# Patient Record
Sex: FEMALE | Race: WHITE | NOT HISPANIC OR LATINO | URBAN - METROPOLITAN AREA
[De-identification: names, ages, dates, MRNs, and addresses within clinical notes are randomized per-mention and may not be internally consistent; named-entity substitution may affect disease eponyms.]

---

## 2017-01-05 ENCOUNTER — OUTPATIENT (OUTPATIENT)
Dept: OUTPATIENT SERVICES | Facility: HOSPITAL | Age: 47
LOS: 1 days | End: 2017-01-05
Payer: COMMERCIAL

## 2017-01-05 VITALS
HEIGHT: 65 IN | WEIGHT: 166.89 LBS | HEART RATE: 66 BPM | RESPIRATION RATE: 16 BRPM | SYSTOLIC BLOOD PRESSURE: 143 MMHG | DIASTOLIC BLOOD PRESSURE: 71 MMHG | TEMPERATURE: 98 F

## 2017-01-05 DIAGNOSIS — Z98.1 ARTHRODESIS STATUS: Chronic | ICD-10-CM

## 2017-01-05 DIAGNOSIS — Z01.818 ENCOUNTER FOR OTHER PREPROCEDURAL EXAMINATION: ICD-10-CM

## 2017-01-05 DIAGNOSIS — Z98.890 OTHER SPECIFIED POSTPROCEDURAL STATES: Chronic | ICD-10-CM

## 2017-01-05 DIAGNOSIS — D21.5 BENIGN NEOPLASM OF CONNECTIVE AND OTHER SOFT TISSUE OF PELVIS: ICD-10-CM

## 2017-01-05 DIAGNOSIS — Z98.89 OTHER SPECIFIED POSTPROCEDURAL STATES: Chronic | ICD-10-CM

## 2017-01-05 DIAGNOSIS — M43.22 FUSION OF SPINE, CERVICAL REGION: Chronic | ICD-10-CM

## 2017-01-05 DIAGNOSIS — D25.9 LEIOMYOMA OF UTERUS, UNSPECIFIED: ICD-10-CM

## 2017-01-05 LAB
HCG SERPL-ACNC: <1 MIU/ML — SIGNIFICANT CHANGE UP
HCT VFR BLD CALC: 43 % — SIGNIFICANT CHANGE UP (ref 34.5–45)
HGB BLD-MCNC: 13.9 G/DL — SIGNIFICANT CHANGE UP (ref 11.5–15.5)
MCHC RBC-ENTMCNC: 29.9 PG — SIGNIFICANT CHANGE UP (ref 27–34)
MCHC RBC-ENTMCNC: 32.2 GM/DL — SIGNIFICANT CHANGE UP (ref 32–36)
MCV RBC AUTO: 92.9 FL — SIGNIFICANT CHANGE UP (ref 80–100)
PLATELET # BLD AUTO: 228 K/UL — SIGNIFICANT CHANGE UP (ref 150–400)
RBC # BLD: 4.63 M/UL — SIGNIFICANT CHANGE UP (ref 3.8–5.2)
RBC # FLD: 12.6 % — SIGNIFICANT CHANGE UP (ref 10.3–14.5)
WBC # BLD: 7.4 K/UL — SIGNIFICANT CHANGE UP (ref 3.8–10.5)
WBC # FLD AUTO: 7.4 K/UL — SIGNIFICANT CHANGE UP (ref 3.8–10.5)

## 2017-01-05 PROCEDURE — 86850 RBC ANTIBODY SCREEN: CPT

## 2017-01-05 PROCEDURE — 86900 BLOOD TYPING SEROLOGIC ABO: CPT

## 2017-01-05 PROCEDURE — 86901 BLOOD TYPING SEROLOGIC RH(D): CPT

## 2017-01-05 PROCEDURE — 85027 COMPLETE CBC AUTOMATED: CPT

## 2017-01-05 PROCEDURE — G0463: CPT

## 2017-01-05 PROCEDURE — 84702 CHORIONIC GONADOTROPIN TEST: CPT

## 2017-01-05 NOTE — H&P PST ADULT - FAMILY HISTORY
Father  Still living? Yes, Estimated age: 71-80  Family history of renal cancer, Age at diagnosis: Age Unknown  Family history of prostate cancer in father, Age at diagnosis: Age Unknown     Mother  Still living? No  Family history of myocardial infarction, Age at diagnosis: Age Unknown  Family history of cerebrovascular accident (CVA), Age at diagnosis: Age Unknown  Family history of COPD (chronic obstructive pulmonary disease), Age at diagnosis: Age Unknown

## 2017-01-05 NOTE — ASU PATIENT PROFILE, ADULT - PSH
Cervical vertebral fusion  April 2016  H/O hand surgery  Left thumb 2015  S/P lumbar fusion  August 2016  S/P tubal ligation  2006  Status post labral repair of shoulder  Left, 2012

## 2017-01-05 NOTE — H&P PST ADULT - NSANTHOSAYNRD_GEN_A_CORE
No. ANGIE screening performed.  STOP BANG Legend: 0-2 = LOW Risk; 3-4 = INTERMEDIATE Risk; 5-8 = HIGH Risk

## 2017-01-05 NOTE — H&P PST ADULT - PROBLEM SELECTOR PLAN 2
labs- CBC, HCG and T&S  No MC needed  Pre op and Hibiclens instructions reviewed and given. Can continue present pain management Oxycodone as needed am of surgery.
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
## 2017-01-05 NOTE — H&P PST ADULT - ASSESSMENT
47 yo female with a uterine fibroid scheduled for a laparoscopic myomectomy on 1/13/17 with Dr. Lobato

## 2017-01-05 NOTE — H&P PST ADULT - PSH
H/O hand surgery  Left thumb 2015  S/P tubal ligation  2006  Status post labral repair of shoulder  Left, 2012 Cervical vertebral fusion  April 2016  H/O hand surgery  Left thumb 2015  S/P lumbar fusion  August 2016  S/P tubal ligation  2006  Status post labral repair of shoulder  Left, 2012
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
## 2017-01-05 NOTE — H&P PST ADULT - HISTORY OF PRESENT ILLNESS
47 yo female presents to PST with a uterine fibroid scheduled for a  laparoscopic myomectomy on 17 with Dr. Lobato.  LMP 2016. H/O TOP. Ultrasound shows a pedunculated myoma 6 cm, 8 week sized uterus. C/O pelvic pain. H/O abnormal pap. (+) HPV. H/O chronic low back pain 47 yo female presents to PST with a uterine fibroid scheduled for a laparoscopic myomectomy on 17 with Dr. Lobato.  LMP 2016. H/O TOP. Ultrasound shows a pedunculated myoma 6 cm, 8 week sized uterus. C/O right pelvic pain. H/O abnormal pap. (+) HPV. H/O chronic back pain s/p cervical and lumbar  fusion in . Followed by pain management.

## 2017-01-12 RX ORDER — SODIUM CHLORIDE 9 MG/ML
1000 INJECTION, SOLUTION INTRAVENOUS
Qty: 0 | Refills: 0 | Status: DISCONTINUED | OUTPATIENT
Start: 2017-01-13 | End: 2017-01-13

## 2017-01-13 ENCOUNTER — OUTPATIENT (OUTPATIENT)
Dept: OUTPATIENT SERVICES | Facility: HOSPITAL | Age: 47
LOS: 1 days | Discharge: HOME | End: 2017-01-13
Payer: COMMERCIAL

## 2017-01-13 VITALS
HEART RATE: 74 BPM | DIASTOLIC BLOOD PRESSURE: 64 MMHG | SYSTOLIC BLOOD PRESSURE: 118 MMHG | OXYGEN SATURATION: 98 % | RESPIRATION RATE: 16 BRPM

## 2017-01-13 VITALS
TEMPERATURE: 98 F | OXYGEN SATURATION: 98 % | DIASTOLIC BLOOD PRESSURE: 76 MMHG | HEART RATE: 79 BPM | WEIGHT: 166.89 LBS | SYSTOLIC BLOOD PRESSURE: 127 MMHG | RESPIRATION RATE: 16 BRPM | HEIGHT: 65 IN

## 2017-01-13 DIAGNOSIS — M43.22 FUSION OF SPINE, CERVICAL REGION: Chronic | ICD-10-CM

## 2017-01-13 DIAGNOSIS — D21.5 BENIGN NEOPLASM OF CONNECTIVE AND OTHER SOFT TISSUE OF PELVIS: ICD-10-CM

## 2017-01-13 DIAGNOSIS — Z98.890 OTHER SPECIFIED POSTPROCEDURAL STATES: Chronic | ICD-10-CM

## 2017-01-13 DIAGNOSIS — Z98.1 ARTHRODESIS STATUS: Chronic | ICD-10-CM

## 2017-01-13 DIAGNOSIS — Z98.89 OTHER SPECIFIED POSTPROCEDURAL STATES: Chronic | ICD-10-CM

## 2017-01-13 LAB — HCG UR QL: NEGATIVE — SIGNIFICANT CHANGE UP

## 2017-01-13 PROCEDURE — C1889: CPT

## 2017-01-13 PROCEDURE — 58545 LAPAROSCOPIC MYOMECTOMY: CPT

## 2017-01-13 PROCEDURE — 88305 TISSUE EXAM BY PATHOLOGIST: CPT | Mod: 26

## 2017-01-13 PROCEDURE — 81025 URINE PREGNANCY TEST: CPT

## 2017-01-13 PROCEDURE — 88305 TISSUE EXAM BY PATHOLOGIST: CPT

## 2017-01-13 RX ORDER — CEFAZOLIN SODIUM 1 G
2000 VIAL (EA) INJECTION ONCE
Qty: 0 | Refills: 0 | Status: COMPLETED | OUTPATIENT
Start: 2017-01-13 | End: 2017-01-13

## 2017-01-13 RX ORDER — FENTANYL CITRATE 50 UG/ML
50 INJECTION INTRAVENOUS
Qty: 0 | Refills: 0 | Status: DISCONTINUED | OUTPATIENT
Start: 2017-01-13 | End: 2017-01-13

## 2017-01-13 RX ADMIN — SODIUM CHLORIDE 75 MILLILITER(S): 9 INJECTION, SOLUTION INTRAVENOUS at 08:46

## 2017-01-13 RX ADMIN — FENTANYL CITRATE 50 MICROGRAM(S): 50 INJECTION INTRAVENOUS at 11:13

## 2017-01-13 RX ADMIN — SODIUM CHLORIDE 100 MILLILITER(S): 9 INJECTION, SOLUTION INTRAVENOUS at 11:12

## 2017-01-13 RX ADMIN — FENTANYL CITRATE 50 MICROGRAM(S): 50 INJECTION INTRAVENOUS at 11:23

## 2017-01-13 RX ADMIN — FENTANYL CITRATE 50 MICROGRAM(S): 50 INJECTION INTRAVENOUS at 11:24

## 2017-01-13 NOTE — ASU DISCHARGE PLAN (ADULT/PEDIATRIC). - ACTIVITY LEVEL
no heavy lifting/no tampons/no exercise/no douching/nothing per vagina/no sports/gym/no tub baths/no intercourse

## 2017-01-13 NOTE — ASU DISCHARGE PLAN (ADULT/PEDIATRIC). - INSTRUCTIONS
****Call the office with any problems including but not limited to heavy vaginal bleeding, fevers, severe abdominal pain, inability to eat/drink/urinate  **** Nothing in the vagina x2 weeks-( No sex, tampons, douching )  *****You may shower as usual but no hot tubs, bath tubs, swimming pools x2 weeks.

## 2017-01-13 NOTE — BRIEF OPERATIVE NOTE - OPERATION/FINDINGS
6 cm right retroperitoneal broad ligament myoma. Right retroperitoneum opened and ureter dissected from pelvic brim to deep pelvis. Myoma excised without difficulty, arising from 3 cm pedicle in right cervix/lower uterine segment.

## 2017-01-13 NOTE — ASU DISCHARGE PLAN (ADULT/PEDIATRIC). - NOTIFY
Fever greater than 101/Bleeding that does not stop/Pain not relieved by Medications/Persistent Nausea and Vomiting/Excessive Diarrhea Excessive Diarrhea/Swelling that continues/Fever greater than 101/Bleeding that does not stop/Pain not relieved by Medications/Persistent Nausea and Vomiting/GYN Fever>100.4/Unable to Urinate

## 2017-01-13 NOTE — ASU DISCHARGE PLAN (ADULT/PEDIATRIC). - MEDICATION SUMMARY - MEDICATIONS TO TAKE
I will START or STAY ON the medications listed below when I get home from the hospital:    Fioricet oral capsule  -- 1 tab(s) by mouth prn, As Needed  -- Indication: For home med    Motrin 600 mg oral tablet  -- 1 tab(s) by mouth every 8 hours x 3 days round the clock  -- Indication: For pain med    oxyCODONE 15 mg oral tablet  -- 1 tab(s) by mouth every 6 hours  -- Indication: For pain med    gabapentin 600 mg oral tablet  -- 1 tab(s) by mouth 3 times a day  -- Indication: For home med    gabapentin 300 mg oral tablet  --  by mouth 3 times a day  -- Indication: For home med    Benadryl Children's Allergy Fastmelt 12.5 mg oral tablet, disintegrating  -- 2 cap(s) by mouth prn, As Needed  -- Indication: For home med    SEROquel 100 mg oral tablet  -- 1 tab(s) by mouth once a day (at bedtime)  -- Indication: For home med    Soma 350 mg oral tablet  -- 1 tab(s) by mouth 3 times a day  -- Indication: For home med    gentamicin 0.3% ophthalmic ointment  -- 0.5 inch(es) to each affected eye 3 times a day  -- Indication: For home med    multivitamin  --   once a day  -- Indication: For home med

## 2017-01-17 LAB — SURGICAL PATHOLOGY FINAL REPORT - CH: SIGNIFICANT CHANGE UP

## 2017-01-18 DIAGNOSIS — Z98.1 ARTHRODESIS STATUS: ICD-10-CM

## 2017-01-18 DIAGNOSIS — D25.9 LEIOMYOMA OF UTERUS, UNSPECIFIED: ICD-10-CM

## 2017-01-18 DIAGNOSIS — Z79.899 OTHER LONG TERM (CURRENT) DRUG THERAPY: ICD-10-CM

## 2017-01-18 DIAGNOSIS — M79.7 FIBROMYALGIA: ICD-10-CM

## 2017-01-18 DIAGNOSIS — Z79.891 LONG TERM (CURRENT) USE OF OPIATE ANALGESIC: ICD-10-CM

## 2017-03-27 NOTE — ASU PREOP CHECKLIST - TYPE OF SOLUTION
"Chief Complaint   Patient presents with     Physical       Initial /82 (BP Location: Right arm, Patient Position: Chair, Cuff Size: Adult Large)  Pulse 82  Temp 98.1  F (36.7  C) (Oral)  Ht 5' 7\" (1.702 m)  Wt 185 lb 8 oz (84.1 kg)  SpO2 95%  BMI 29.05 kg/m2 Estimated body mass index is 29.05 kg/(m^2) as calculated from the following:    Height as of this encounter: 5' 7\" (1.702 m).    Weight as of this encounter: 185 lb 8 oz (84.1 kg).  Medication Reconciliation: complete Riana Daily CMA      " lr

## 2017-04-26 PROBLEM — M54.9 DORSALGIA, UNSPECIFIED: Chronic | Status: ACTIVE | Noted: 2017-01-05

## 2017-04-26 PROBLEM — D25.9 LEIOMYOMA OF UTERUS, UNSPECIFIED: Chronic | Status: ACTIVE | Noted: 2017-01-05

## 2017-05-08 ENCOUNTER — APPOINTMENT (OUTPATIENT)
Dept: OBGYN | Facility: CLINIC | Age: 47
End: 2017-05-08

## 2017-05-24 ENCOUNTER — APPOINTMENT (OUTPATIENT)
Dept: OBGYN | Facility: CLINIC | Age: 47
End: 2017-05-24

## 2017-05-24 ENCOUNTER — LABORATORY RESULT (OUTPATIENT)
Age: 47
End: 2017-05-24

## 2017-05-24 VITALS
DIASTOLIC BLOOD PRESSURE: 72 MMHG | WEIGHT: 170 LBS | BODY MASS INDEX: 28.32 KG/M2 | HEIGHT: 65 IN | SYSTOLIC BLOOD PRESSURE: 120 MMHG

## 2017-05-24 DIAGNOSIS — Z98.890 OTHER SPECIFIED POSTPROCEDURAL STATES: ICD-10-CM

## 2017-05-24 DIAGNOSIS — Z80.3 FAMILY HISTORY OF MALIGNANT NEOPLASM OF BREAST: ICD-10-CM

## 2017-05-24 DIAGNOSIS — N89.8 OTHER SPECIFIED NONINFLAMMATORY DISORDERS OF VAGINA: ICD-10-CM

## 2017-05-24 LAB — HEMOCCULT SP1 STL QL: NEGATIVE

## 2017-06-01 DIAGNOSIS — N76.0 ACUTE VAGINITIS: ICD-10-CM

## 2017-06-01 DIAGNOSIS — B96.89 ACUTE VAGINITIS: ICD-10-CM

## 2017-06-01 LAB
C TRACH RRNA SPEC QL NAA+PROBE: NORMAL
CYTOLOGY CVX/VAG DOC THIN PREP: NORMAL
HPV HIGH+LOW RISK DNA PNL CVX: POSITIVE
N GONORRHOEA RRNA SPEC QL NAA+PROBE: NORMAL
SOURCE TP AMPLIFICATION: NORMAL

## 2017-06-13 DIAGNOSIS — Z13.71 ENCOUNTER FOR NONPROCREATIVE SCREENING FOR GENETIC DISEASE CARRIER STATUS: ICD-10-CM

## 2017-07-26 ENCOUNTER — APPOINTMENT (OUTPATIENT)
Dept: OBGYN | Facility: CLINIC | Age: 47
End: 2017-07-26

## 2017-07-26 VITALS
WEIGHT: 170 LBS | BODY MASS INDEX: 28.32 KG/M2 | SYSTOLIC BLOOD PRESSURE: 122 MMHG | HEIGHT: 65 IN | DIASTOLIC BLOOD PRESSURE: 72 MMHG

## 2017-07-26 LAB
HCG UR QL: NEGATIVE
QUALITY CONTROL: YES

## 2017-08-01 LAB — CORE LAB BIOPSY: NORMAL

## 2017-10-09 ENCOUNTER — TRANSCRIPTION ENCOUNTER (OUTPATIENT)
Age: 47
End: 2017-10-09

## 2017-10-25 ENCOUNTER — APPOINTMENT (OUTPATIENT)
Dept: OBGYN | Facility: CLINIC | Age: 47
End: 2017-10-25
Payer: COMMERCIAL

## 2017-10-25 VITALS
BODY MASS INDEX: 28.32 KG/M2 | HEIGHT: 65 IN | WEIGHT: 170 LBS | DIASTOLIC BLOOD PRESSURE: 72 MMHG | SYSTOLIC BLOOD PRESSURE: 124 MMHG

## 2017-10-25 DIAGNOSIS — B97.7 PAPILLOMAVIRUS AS THE CAUSE OF DISEASES CLASSIFIED ELSEWHERE: ICD-10-CM

## 2017-10-25 PROCEDURE — 99214 OFFICE O/P EST MOD 30 MIN: CPT

## 2017-10-25 RX ORDER — MIRTAZAPINE 7.5 MG/1
7.5 TABLET, FILM COATED ORAL
Qty: 30 | Refills: 0 | Status: DISCONTINUED | COMMUNITY
Start: 2017-09-15

## 2017-10-25 RX ORDER — ERGOCALCIFEROL 1.25 MG/1
1.25 MG CAPSULE, LIQUID FILLED ORAL
Qty: 8 | Refills: 0 | Status: ACTIVE | COMMUNITY
Start: 2017-07-11

## 2017-10-25 RX ORDER — IPRATROPIUM BROMIDE 0.5 MG/2.5ML
0.02 SOLUTION RESPIRATORY (INHALATION)
Qty: 62 | Refills: 0 | Status: DISCONTINUED | COMMUNITY
Start: 2017-07-10

## 2017-10-25 RX ORDER — SILVER SULFADIAZINE 10 MG/G
1 CREAM TOPICAL
Qty: 50 | Refills: 0 | Status: DISCONTINUED | COMMUNITY
Start: 2017-08-03

## 2017-10-25 RX ORDER — OXYCODONE HYDROCHLORIDE 15 MG/1
15 TABLET ORAL
Qty: 120 | Refills: 0 | Status: DISCONTINUED | COMMUNITY
Start: 2017-08-14

## 2017-10-25 RX ORDER — QUETIAPINE FUMARATE 100 MG/1
100 TABLET ORAL
Qty: 30 | Refills: 0 | Status: ACTIVE | COMMUNITY
Start: 2017-05-10

## 2017-10-25 RX ORDER — TOBRAMYCIN AND DEXAMETHASONE 3; 1 MG/ML; MG/ML
0.3-0.1 SUSPENSION/ DROPS OPHTHALMIC
Qty: 10 | Refills: 0 | Status: DISCONTINUED | COMMUNITY
Start: 2016-11-22

## 2017-10-25 RX ORDER — METHYLPREDNISOLONE 4 MG/1
4 TABLET ORAL
Qty: 21 | Refills: 0 | Status: DISCONTINUED | COMMUNITY
Start: 2017-08-21

## 2017-10-25 RX ORDER — BUTALBITAL, ACETAMINOPHEN AND CAFFEINE 325; 50; 40 MG/1; MG/1; MG/1
50-325-40 TABLET ORAL
Qty: 60 | Refills: 0 | Status: ACTIVE | COMMUNITY
Start: 2017-08-03

## 2017-10-25 RX ORDER — LIDOCAINE 5% 700 MG/1
5 PATCH TOPICAL
Qty: 30 | Refills: 0 | Status: DISCONTINUED | COMMUNITY
Start: 2016-10-10

## 2017-10-25 RX ORDER — VALACYCLOVIR 1 G/1
1 TABLET, FILM COATED ORAL
Qty: 60 | Refills: 0 | Status: DISCONTINUED | COMMUNITY
Start: 2016-09-06

## 2017-10-25 RX ORDER — ACYCLOVIR 50 MG/G
5 OINTMENT TOPICAL
Qty: 15 | Refills: 0 | Status: DISCONTINUED | COMMUNITY
Start: 2016-09-06

## 2017-10-25 RX ORDER — ALBUTEROL SULFATE 90 UG/1
108 (90 BASE) AEROSOL, METERED RESPIRATORY (INHALATION)
Qty: 8 | Refills: 0 | Status: ACTIVE | COMMUNITY
Start: 2017-08-28

## 2017-10-25 RX ORDER — CLOTRIMAZOLE AND BETAMETHASONE DIPROPIONATE 10; .5 MG/G; MG/G
1-0.05 CREAM TOPICAL
Qty: 90 | Refills: 0 | Status: DISCONTINUED | COMMUNITY
Start: 2017-01-26

## 2017-10-25 RX ORDER — NAPROXEN 500 MG/1
500 TABLET, DELAYED RELEASE ORAL
Qty: 60 | Refills: 0 | Status: DISCONTINUED | COMMUNITY
Start: 2017-10-13

## 2017-10-25 RX ORDER — CIPROFLOXACIN HYDROCHLORIDE 500 MG/1
500 TABLET, FILM COATED ORAL
Qty: 20 | Refills: 0 | Status: DISCONTINUED | COMMUNITY
Start: 2017-08-21

## 2017-10-25 RX ORDER — OMEPRAZOLE 40 MG/1
40 CAPSULE, DELAYED RELEASE ORAL
Qty: 30 | Refills: 0 | Status: DISCONTINUED | COMMUNITY
Start: 2017-04-18

## 2017-10-25 RX ORDER — IPRATROPIUM BROMIDE AND ALBUTEROL SULFATE 2.5; .5 MG/3ML; MG/3ML
0.5-2.5 (3) SOLUTION RESPIRATORY (INHALATION)
Qty: 90 | Refills: 0 | Status: DISCONTINUED | COMMUNITY
Start: 2017-08-28

## 2017-10-25 RX ORDER — METRONIDAZOLE 7.5 MG/G
0.75 GEL VAGINAL
Qty: 1 | Refills: 1 | Status: DISCONTINUED | COMMUNITY
Start: 2017-06-01 | End: 2017-10-25

## 2017-10-25 RX ORDER — FLUTICASONE PROPIONATE 50 UG/1
50 SPRAY, METERED NASAL
Qty: 16 | Refills: 0 | Status: ACTIVE | COMMUNITY
Start: 2017-04-18

## 2017-10-25 RX ORDER — PREDNISONE 20 MG/1
20 TABLET ORAL
Qty: 18 | Refills: 0 | Status: DISCONTINUED | COMMUNITY
Start: 2017-07-10

## 2017-10-30 LAB — CYTOLOGY CVX/VAG DOC THIN PREP: NORMAL

## 2017-11-16 NOTE — H&P PST ADULT - AS BP NONINV METHOD
How Severe Are Your Spot(S)?: moderate
What Is The Reason For Today's Visit?: Skin Lesions
electronic

## 2018-03-22 ENCOUNTER — OUTPATIENT (OUTPATIENT)
Dept: OUTPATIENT SERVICES | Facility: HOSPITAL | Age: 48
LOS: 1 days | End: 2018-03-22
Payer: COMMERCIAL

## 2018-03-22 VITALS
HEART RATE: 76 BPM | SYSTOLIC BLOOD PRESSURE: 148 MMHG | DIASTOLIC BLOOD PRESSURE: 84 MMHG | OXYGEN SATURATION: 99 % | HEIGHT: 64.5 IN | RESPIRATION RATE: 16 BRPM | WEIGHT: 175.05 LBS | TEMPERATURE: 98 F

## 2018-03-22 DIAGNOSIS — M54.9 DORSALGIA, UNSPECIFIED: ICD-10-CM

## 2018-03-22 DIAGNOSIS — J34.2 DEVIATED NASAL SEPTUM: ICD-10-CM

## 2018-03-22 DIAGNOSIS — Z98.890 OTHER SPECIFIED POSTPROCEDURAL STATES: Chronic | ICD-10-CM

## 2018-03-22 DIAGNOSIS — Z98.1 ARTHRODESIS STATUS: Chronic | ICD-10-CM

## 2018-03-22 DIAGNOSIS — M43.22 FUSION OF SPINE, CERVICAL REGION: Chronic | ICD-10-CM

## 2018-03-22 DIAGNOSIS — Z98.89 OTHER SPECIFIED POSTPROCEDURAL STATES: Chronic | ICD-10-CM

## 2018-03-22 LAB
ANION GAP SERPL CALC-SCNC: 12 MMOL/L — SIGNIFICANT CHANGE UP (ref 5–17)
APTT BLD: 26.3 SEC — LOW (ref 27.5–37.4)
BUN SERPL-MCNC: 14 MG/DL — SIGNIFICANT CHANGE UP (ref 7–23)
CALCIUM SERPL-MCNC: 9.1 MG/DL — SIGNIFICANT CHANGE UP (ref 8.4–10.5)
CHLORIDE SERPL-SCNC: 100 MMOL/L — SIGNIFICANT CHANGE UP (ref 96–108)
CO2 SERPL-SCNC: 25 MMOL/L — SIGNIFICANT CHANGE UP (ref 22–31)
CREAT SERPL-MCNC: 0.69 MG/DL — SIGNIFICANT CHANGE UP (ref 0.5–1.3)
GLUCOSE SERPL-MCNC: 106 MG/DL — HIGH (ref 70–99)
HCT VFR BLD CALC: 39 % — SIGNIFICANT CHANGE UP (ref 34.5–45)
HGB BLD-MCNC: 13.2 G/DL — SIGNIFICANT CHANGE UP (ref 11.5–15.5)
INR BLD: 0.94 RATIO — SIGNIFICANT CHANGE UP (ref 0.88–1.16)
MCHC RBC-ENTMCNC: 30.8 PG — SIGNIFICANT CHANGE UP (ref 27–34)
MCHC RBC-ENTMCNC: 33.8 GM/DL — SIGNIFICANT CHANGE UP (ref 32–36)
MCV RBC AUTO: 91.1 FL — SIGNIFICANT CHANGE UP (ref 80–100)
NRBC # BLD: 0 /100 WBCS — SIGNIFICANT CHANGE UP (ref 0–0)
PLATELET # BLD AUTO: 241 K/UL — SIGNIFICANT CHANGE UP (ref 150–400)
POTASSIUM SERPL-MCNC: 4 MMOL/L — SIGNIFICANT CHANGE UP (ref 3.5–5.3)
POTASSIUM SERPL-SCNC: 4 MMOL/L — SIGNIFICANT CHANGE UP (ref 3.5–5.3)
PROTHROM AB SERPL-ACNC: 10.6 SEC — SIGNIFICANT CHANGE UP (ref 10–13.1)
RBC # BLD: 4.28 M/UL — SIGNIFICANT CHANGE UP (ref 3.8–5.2)
RBC # FLD: 14 % — SIGNIFICANT CHANGE UP (ref 10.3–14.5)
SODIUM SERPL-SCNC: 137 MMOL/L — SIGNIFICANT CHANGE UP (ref 135–145)
WBC # BLD: 5.52 K/UL — SIGNIFICANT CHANGE UP (ref 3.8–10.5)
WBC # FLD AUTO: 5.52 K/UL — SIGNIFICANT CHANGE UP (ref 3.8–10.5)

## 2018-03-22 PROCEDURE — 85730 THROMBOPLASTIN TIME PARTIAL: CPT

## 2018-03-22 PROCEDURE — 80048 BASIC METABOLIC PNL TOTAL CA: CPT

## 2018-03-22 PROCEDURE — 85027 COMPLETE CBC AUTOMATED: CPT

## 2018-03-22 PROCEDURE — G0463: CPT

## 2018-03-22 PROCEDURE — 85610 PROTHROMBIN TIME: CPT

## 2018-03-22 RX ORDER — GABAPENTIN 400 MG/1
1 CAPSULE ORAL
Qty: 0 | Refills: 0 | COMMUNITY

## 2018-03-22 RX ORDER — SODIUM CHLORIDE 9 MG/ML
3 INJECTION INTRAMUSCULAR; INTRAVENOUS; SUBCUTANEOUS EVERY 8 HOURS
Qty: 0 | Refills: 0 | Status: DISCONTINUED | OUTPATIENT
Start: 2018-04-05 | End: 2018-04-20

## 2018-03-22 RX ORDER — CARISOPRODOL 250 MG
1 TABLET ORAL
Qty: 0 | Refills: 0 | COMMUNITY

## 2018-03-22 RX ORDER — DIPHENHYDRAMINE HCL 50 MG
2 CAPSULE ORAL
Qty: 0 | Refills: 0 | COMMUNITY

## 2018-03-22 RX ORDER — LIDOCAINE HCL 20 MG/ML
0.2 VIAL (ML) INJECTION ONCE
Qty: 0 | Refills: 0 | Status: DISCONTINUED | OUTPATIENT
Start: 2018-04-05 | End: 2018-04-20

## 2018-03-22 RX ORDER — QUETIAPINE FUMARATE 200 MG/1
1 TABLET, FILM COATED ORAL
Qty: 0 | Refills: 0 | COMMUNITY

## 2018-03-22 RX ORDER — OXYCODONE HYDROCHLORIDE 5 MG/1
1 TABLET ORAL
Qty: 0 | Refills: 0 | COMMUNITY

## 2018-03-22 RX ORDER — GABAPENTIN 400 MG/1
0 CAPSULE ORAL
Qty: 0 | Refills: 0 | COMMUNITY

## 2018-03-22 RX ORDER — IBUPROFEN 200 MG
1 TABLET ORAL
Qty: 0 | Refills: 0 | COMMUNITY

## 2018-03-22 RX ORDER — GENTAMICIN SULFATE 3 MG/ML
0.5 SOLUTION/ DROPS OPHTHALMIC
Qty: 0 | Refills: 0 | COMMUNITY
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
## 2018-03-22 NOTE — H&P PST ADULT - NSANTHOSAYNRD_GEN_A_CORE
Neck 14 in./No. ANGIE screening performed.  STOP BANG Legend: 0-2 = LOW Risk; 3-4 = INTERMEDIATE Risk; 5-8 = HIGH Risk
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
## 2018-03-22 NOTE — H&P PST ADULT - PSH
Cervical vertebral fusion  April 2016  H/O hand surgery  Left thumb 2015  S/P lumbar fusion  August 2016  S/P tubal ligation  2006  Status post labral repair of shoulder  Left, 2012 Cervical vertebral fusion  April 2016  H/O hand surgery  Left thumb 2015  S/P lumbar fusion  August 2016  S/P tubal ligation  2006  Status post labral repair of shoulder  Left, 2012  [facility]

## 2018-04-04 ENCOUNTER — TRANSCRIPTION ENCOUNTER (OUTPATIENT)
Age: 48
End: 2018-04-04

## 2018-04-04 RX ORDER — ONDANSETRON 8 MG/1
4 TABLET, FILM COATED ORAL ONCE
Qty: 0 | Refills: 0 | Status: COMPLETED | OUTPATIENT
Start: 2018-04-05 | End: 2018-04-05

## 2018-04-04 RX ORDER — SODIUM CHLORIDE 9 MG/ML
1000 INJECTION, SOLUTION INTRAVENOUS
Qty: 0 | Refills: 0 | Status: DISCONTINUED | OUTPATIENT
Start: 2018-04-05 | End: 2018-04-20

## 2018-04-05 ENCOUNTER — OUTPATIENT (OUTPATIENT)
Dept: OUTPATIENT SERVICES | Facility: HOSPITAL | Age: 48
LOS: 1 days | End: 2018-04-05
Payer: COMMERCIAL

## 2018-04-05 ENCOUNTER — RESULT REVIEW (OUTPATIENT)
Age: 48
End: 2018-04-05

## 2018-04-05 VITALS
OXYGEN SATURATION: 100 % | DIASTOLIC BLOOD PRESSURE: 77 MMHG | HEART RATE: 72 BPM | WEIGHT: 175.05 LBS | RESPIRATION RATE: 15 BRPM | TEMPERATURE: 98 F | SYSTOLIC BLOOD PRESSURE: 122 MMHG | HEIGHT: 64.5 IN

## 2018-04-05 VITALS
HEART RATE: 74 BPM | OXYGEN SATURATION: 100 % | RESPIRATION RATE: 16 BRPM | SYSTOLIC BLOOD PRESSURE: 122 MMHG | DIASTOLIC BLOOD PRESSURE: 67 MMHG | TEMPERATURE: 98 F

## 2018-04-05 DIAGNOSIS — Z98.1 ARTHRODESIS STATUS: Chronic | ICD-10-CM

## 2018-04-05 DIAGNOSIS — Z98.89 OTHER SPECIFIED POSTPROCEDURAL STATES: Chronic | ICD-10-CM

## 2018-04-05 DIAGNOSIS — M43.22 FUSION OF SPINE, CERVICAL REGION: Chronic | ICD-10-CM

## 2018-04-05 DIAGNOSIS — Z98.890 OTHER SPECIFIED POSTPROCEDURAL STATES: Chronic | ICD-10-CM

## 2018-04-05 DIAGNOSIS — J34.2 DEVIATED NASAL SEPTUM: ICD-10-CM

## 2018-04-05 PROCEDURE — 88300 SURGICAL PATH GROSS: CPT

## 2018-04-05 PROCEDURE — C2625: CPT

## 2018-04-05 PROCEDURE — C1889: CPT

## 2018-04-05 PROCEDURE — 61782 SCAN PROC CRANIAL EXTRA: CPT

## 2018-04-05 PROCEDURE — 30140 RESECT INFERIOR TURBINATE: CPT | Mod: 50

## 2018-04-05 PROCEDURE — 88305 TISSUE EXAM BY PATHOLOGIST: CPT

## 2018-04-05 PROCEDURE — C1726: CPT

## 2018-04-05 PROCEDURE — 31288 NASAL/SINUS ENDOSCOPY SURG: CPT | Mod: 50

## 2018-04-05 PROCEDURE — 31267 ENDOSCOPY MAXILLARY SINUS: CPT | Mod: 50

## 2018-04-05 PROCEDURE — 88300 SURGICAL PATH GROSS: CPT | Mod: 26,59

## 2018-04-05 PROCEDURE — 31255 NSL/SINS NDSC W/TOT ETHMDCT: CPT | Mod: 50

## 2018-04-05 PROCEDURE — 30520 REPAIR OF NASAL SEPTUM: CPT

## 2018-04-05 PROCEDURE — 88305 TISSUE EXAM BY PATHOLOGIST: CPT | Mod: 26

## 2018-04-05 RX ORDER — OXYCODONE HYDROCHLORIDE 5 MG/1
5 TABLET ORAL ONCE
Qty: 0 | Refills: 0 | Status: DISCONTINUED | OUTPATIENT
Start: 2018-04-05 | End: 2018-04-05

## 2018-04-05 RX ADMIN — OXYCODONE HYDROCHLORIDE 5 MILLIGRAM(S): 5 TABLET ORAL at 15:16

## 2018-04-05 RX ADMIN — OXYCODONE HYDROCHLORIDE 5 MILLIGRAM(S): 5 TABLET ORAL at 15:38

## 2018-04-05 RX ADMIN — ONDANSETRON 4 MILLIGRAM(S): 8 TABLET, FILM COATED ORAL at 15:16

## 2018-04-05 NOTE — ASU DISCHARGE PLAN (ADULT/PEDIATRIC). - ASU FOLLOWUP
[facility] ([facility]): 911 or go to the nearest Emergency Room

## 2018-04-05 NOTE — ASU PATIENT PROFILE, ADULT - PSH
Cervical vertebral fusion  April 2016  H/O hand surgery  Left thumb 2015  S/P lumbar fusion  August 2016  S/P tubal ligation  2006  Status post labral repair of shoulder  Left, 2012  [facility]
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
## 2018-04-05 NOTE — BRIEF OPERATIVE NOTE - PROCEDURE
<<-----Click on this checkbox to enter Procedure FESS with nasal septoplasty using [facility] Navigation system  04/05/2018    Active  LORINF

## 2018-04-05 NOTE — ASU PATIENT PROFILE, ADULT - PT NEEDS ASSIST
pt aox3; pmh of laminectomy, kidney ca, breast ca.  Presents with bilateral lower leg edema and redness that she reports has been there since a week ago.  Pt has non productive cough that she reports has been going on for 3 days.  Crackling noted on ascultation.  Abdomen soft non tender. VSS. Pt saturating 93% on room air. no labs needed at this time. will continue to asses / monitor.
no

## 2018-04-05 NOTE — ASU DISCHARGE PLAN (ADULT/PEDIATRIC). - NOTIFY
Fever greater than 101/Increased Irritability or Sluggishness/Unable to Urinate/Inability to Tolerate Liquids or Foods/Pain not relieved by Medications/Persistent Nausea and Vomiting/Swelling that continues/Bleeding that does not stop

## 2018-04-05 NOTE — ASU DISCHARGE PLAN (ADULT/PEDIATRIC). - SPECIAL INSTRUCTIONS
Change drip pad as needed. Start sprays as soon as u go home. Pain meds as needed Change drip pad as needed. Start sprays as soon as you go home. Pain meds as needed

## 2018-04-05 NOTE — PRE-ANESTHESIA EVALUATION ADULT - NSANTHOSAYNRD_GEN_A_CORE
No. ANGIE screening performed.  STOP BANG Legend: 0-2 = LOW Risk; 3-4 = INTERMEDIATE Risk; 5-8 = HIGH Risk/Neck 14 in.

## 2018-04-12 LAB — SURGICAL PATHOLOGY STUDY: SIGNIFICANT CHANGE UP

## 2018-08-06 ENCOUNTER — APPOINTMENT (OUTPATIENT)
Dept: OBGYN | Facility: CLINIC | Age: 48
End: 2018-08-06
Payer: COMMERCIAL

## 2018-08-06 VITALS
WEIGHT: 175 LBS | DIASTOLIC BLOOD PRESSURE: 80 MMHG | HEIGHT: 65 IN | SYSTOLIC BLOOD PRESSURE: 130 MMHG | BODY MASS INDEX: 29.16 KG/M2

## 2018-08-06 DIAGNOSIS — R39.15 URGENCY OF URINATION: ICD-10-CM

## 2018-08-06 DIAGNOSIS — Z01.419 ENCOUNTER FOR GYNECOLOGICAL EXAMINATION (GENERAL) (ROUTINE) W/OUT ABNORMAL FINDINGS: ICD-10-CM

## 2018-08-06 DIAGNOSIS — Z12.11 ENCOUNTER FOR SCREENING FOR MALIGNANT NEOPLASM OF COLON: ICD-10-CM

## 2018-08-06 DIAGNOSIS — N94.6 DYSMENORRHEA, UNSPECIFIED: ICD-10-CM

## 2018-08-06 DIAGNOSIS — R10.2 PELVIC AND PERINEAL PAIN: ICD-10-CM

## 2018-08-06 LAB
BILIRUB UR QL STRIP: NORMAL
COLLECTION METHOD: NORMAL
GLUCOSE UR-MCNC: NORMAL
HCG UR QL: 0.2 EU/DL
HEMOCCULT SP1 STL QL: NEGATIVE
HGB UR QL STRIP.AUTO: ABNORMAL
KETONES UR-MCNC: NORMAL
LEUKOCYTE ESTERASE UR QL STRIP: ABNORMAL
NITRITE UR QL STRIP: NORMAL
PH UR STRIP: 5.5
PROT UR STRIP-MCNC: NORMAL
SP GR UR STRIP: 1.02

## 2018-08-06 PROCEDURE — 81003 URINALYSIS AUTO W/O SCOPE: CPT | Mod: QW

## 2018-08-06 PROCEDURE — 82270 OCCULT BLOOD FECES: CPT

## 2018-08-06 PROCEDURE — 99396 PREV VISIT EST AGE 40-64: CPT

## 2018-08-06 RX ORDER — CARISOPRODOL 350 MG/1
350 TABLET ORAL
Qty: 120 | Refills: 0 | Status: DISCONTINUED | COMMUNITY
Start: 2017-08-14 | End: 2018-08-06

## 2018-08-06 RX ORDER — IBUPROFEN AND FAMOTIDINE 800; 26.6 MG/1; MG/1
800-26.6 TABLET, COATED ORAL
Qty: 90 | Refills: 0 | Status: DISCONTINUED | COMMUNITY
Start: 2017-09-12 | End: 2018-08-06

## 2018-08-06 RX ORDER — TIZANIDINE HYDROCHLORIDE 4 MG/1
4 CAPSULE ORAL
Refills: 0 | Status: ACTIVE | COMMUNITY
Start: 2018-08-06

## 2018-08-09 LAB
APPEARANCE: CLEAR
BILIRUBIN URINE: NEGATIVE
BLOOD URINE: NEGATIVE
C TRACH RRNA SPEC QL NAA+PROBE: NOT DETECTED
COLOR: YELLOW
CYTOLOGY CVX/VAG DOC THIN PREP: NORMAL
GLUCOSE QUALITATIVE U: NEGATIVE MG/DL
HPV HIGH+LOW RISK DNA PNL CVX: NOT DETECTED
KETONES URINE: NEGATIVE
LEUKOCYTE ESTERASE URINE: NEGATIVE
N GONORRHOEA RRNA SPEC QL NAA+PROBE: NOT DETECTED
NITRITE URINE: NEGATIVE
PH URINE: 5.5
PROTEIN URINE: NEGATIVE MG/DL
SOURCE TP AMPLIFICATION: NORMAL
SPECIFIC GRAVITY URINE: 1.02
UROBILINOGEN URINE: NEGATIVE MG/DL

## 2019-07-28 ENCOUNTER — TRANSCRIPTION ENCOUNTER (OUTPATIENT)
Age: 49
End: 2019-07-28

## 2021-09-07 NOTE — ASU PATIENT PROFILE, ADULT - DOES PATIENT HAVE ADVANCE DIRECTIVE
Created and sent result letter to patient via the online patient portal conveying negative GC/Chlam and trich results.
No/HCP given

## 2022-01-01 NOTE — H&P PST ADULT - SOURCE OF INFORMATION, PROFILE
NICU Progress Note    Esperanza Fam (gloria Owusu) is a term female infant born 2022  3:42 PM at Gestational Age: 37w1d now at age: 25-hour old  Patient Active Problem List    Diagnosis Date Noted   • [place] affected by IUGR 2022     Priority: Low     Subjective: 37 week GA female with IOL for IUGR, infant admitted to NICU due to Birthweight 1740gm.  Was on room air overnight. No alarms  Did have some hypoglycemia on IV fluids, that has weaned off this morning. Is requiring some NG feeds.     Objective:  Vitals Last Value 24-Hour Range   Temperature 98.1 °F (36.7 °C) (22 1400) Temp  Min: 97.9 °F (36.6 °C)  Max: 101.7 °F (38.7 °C)   Pulse 150 (22 1400) Pulse  Min: 120  Max: 150   Respiratory 35 (22) Resp  Min: 24  Max: 69   Non-Invasive Blood Pressure (!) 55/37 (22 1400) BP  Min: 55/37  Max: 79/53   Arterial Blood Pressure   No data recorded   Mean Arterial Pressure (!) 42 (22 1400) MAP (mmHg)  Min: 42  Max: 60   Pulse Oximetry 97 % (22) SpO2  Min: 95 %  Max: 100 %     RESP:  RA  Last Apnea:    and intervention:      Physical Exam:  Birthweight:  3 lb 13.4 oz (1740 g) with weight change of -1% since birth  Current weight: No data found. with a weight change of Weight change:  overnight  Gen Sleepy, being held by dad.   Skin:  Pink, well perfused, no edema.    Anterior [place]:  Soft, flat.  Normal facies, no anomalies/bruising noted.  Eyes:  Open without drainage  Lungs:  Clear to auscultation, no retractions  CV:  RRR with no murmur  Abdomen:  Soft, no masses, nondistended   female  Anus present  Normal tone, movement     Fluids:  Intake/Output  Report          P.O. (mL/kg) 27 (15.61) 8 (4.62)    I.V. (mL/kg) 22.61 (13.07) 5.72 (3.31)    NG/GT (mL/kg) 43 (24.86) 28 (16.18)    Total Intake(mL/kg) 92.61 (53.53) 41.72 (24.12)    Urine (mL/kg/hr) 74 31 (1.82)    Total Output(mL/kg) 74 (42.77) 31 (17.92)     Net +18.61 +10.72          Urine Occurrence 2 x 1 x    Stool Occurrence 1 x           Source Rate Total (on BirthWeight: (!) 1740 g (Filed from Delivery Summary)   PIV Capped  mL/hr 13 mL/kg/d   Feeds - EBM/NS22 12 mL q3hr 56 mL/kg/d   Total Fluids   53  ML/kg/d (received)   Urine   5.35 mL/kg/hr      Last Void:  1 (11/08/22 1400)  Last Stool:  1 (11/07/22 1545) x1    Labs:    Recent Results (from the past 24 hour(s))   GLUCOSE, BEDSIDE - POINT OF CARE    Collection Time: 11/07/22  7:59 PM   Result Value Ref Range    GLUCOSE, BEDSIDE - POINT OF CARE 27 (LL) 36 - 89 mg/dL   GLUCOSE, BEDSIDE - POINT OF CARE    Collection Time: 11/07/22  9:46 PM   Result Value Ref Range    GLUCOSE, BEDSIDE - POINT OF CARE 109 (H) 36 - 89 mg/dL   GLUCOSE, BEDSIDE - POINT OF CARE    Collection Time: 11/07/22 11:03 PM   Result Value Ref Range    GLUCOSE, BEDSIDE - POINT OF CARE 83 36 - 89 mg/dL   GLUCOSE, BEDSIDE - POINT OF CARE    Collection Time: 11/08/22  2:09 AM   Result Value Ref Range    GLUCOSE, BEDSIDE - POINT OF CARE 40 36 - 89 mg/dL   Bilirubin Panel    Collection Time: 11/08/22  5:02 AM   Result Value Ref Range    Bilirubin, Total 5.8 2.0 - 6.0 mg/dL    Bilirubin, Direct 0.1 0.0 - 0.6 mg/dL   GLUCOSE, BEDSIDE - POINT OF CARE    Collection Time: 11/08/22  5:03 AM   Result Value Ref Range    GLUCOSE, BEDSIDE - POINT OF CARE 65 36 - 89 mg/dL   GLUCOSE, BEDSIDE - POINT OF CARE    Collection Time: 11/08/22  8:07 AM   Result Value Ref Range    GLUCOSE, BEDSIDE - POINT OF CARE 83 36 - 89 mg/dL   GLUCOSE, BEDSIDE - POINT OF CARE    Collection Time: 11/08/22 11:06 AM   Result Value Ref Range    GLUCOSE, BEDSIDE - POINT OF CARE 56 36 - 89 mg/dL   GLUCOSE, BEDSIDE - POINT OF CARE    Collection Time: 11/08/22  1:56 PM   Result Value Ref Range    GLUCOSE, BEDSIDE - POINT OF CARE 54 36 - 89 mg/dL       Medications:  Current Facility-Administered Medications   Medication   • hepatitis B VACCINE (ENGERIX-B) 10 MCG/0.5ML vaccine 10 mcg   •  dextrose 10 % infusion       Impression:  Late  female infant at 37 1/7 weeks, now corrected to 37w 2d -   SGA  Hypoglycemia, improved  Slow feeder    Plan:  Resp:  Follow on room air  CV:  Follow on monitor  FEN:  Increase minimums and encourage po.  Heme: Follow bili  ID:  Check CMV for SGA status  Neuro: Head ultrasound is not indicated for this infant.     Current antibiotic status:  None     I saw and examined Esperanza Fam on 2022 at 4:51 PM and patient requires: NICU Intensive Care: Enteral/ Parental nutritional adjustments  I met with parents at the bedside  Pediatrician is Dr Lowe   physician office/chart(s)/Ultrasound report./patient

## 2022-01-22 PROBLEM — J32.9 CHRONIC SINUSITIS, UNSPECIFIED: Chronic | Status: ACTIVE | Noted: 2018-03-22

## 2022-01-22 PROBLEM — J34.2 DEVIATED NASAL SEPTUM: Chronic | Status: ACTIVE | Noted: 2018-03-22

## 2022-01-31 ENCOUNTER — TRANSCRIPTION ENCOUNTER (OUTPATIENT)
Age: 52
End: 2022-01-31

## 2022-01-31 RX ORDER — SODIUM CHLORIDE 9 MG/ML
1000 INJECTION, SOLUTION INTRAVENOUS
Refills: 0 | Status: DISCONTINUED | OUTPATIENT
Start: 2022-02-01 | End: 2022-02-15

## 2022-02-01 ENCOUNTER — RESULT REVIEW (OUTPATIENT)
Age: 52
End: 2022-02-01

## 2022-02-01 ENCOUNTER — OUTPATIENT (OUTPATIENT)
Dept: OUTPATIENT SERVICES | Facility: HOSPITAL | Age: 52
LOS: 1 days | End: 2022-02-01
Payer: COMMERCIAL

## 2022-02-01 VITALS
SYSTOLIC BLOOD PRESSURE: 125 MMHG | HEIGHT: 64.5 IN | OXYGEN SATURATION: 99 % | TEMPERATURE: 98 F | WEIGHT: 175.05 LBS | DIASTOLIC BLOOD PRESSURE: 82 MMHG | RESPIRATION RATE: 16 BRPM | HEART RATE: 73 BPM

## 2022-02-01 VITALS
SYSTOLIC BLOOD PRESSURE: 117 MMHG | RESPIRATION RATE: 15 BRPM | OXYGEN SATURATION: 96 % | DIASTOLIC BLOOD PRESSURE: 56 MMHG | HEART RATE: 70 BPM | TEMPERATURE: 98 F

## 2022-02-01 DIAGNOSIS — M43.22 FUSION OF SPINE, CERVICAL REGION: Chronic | ICD-10-CM

## 2022-02-01 DIAGNOSIS — J32.3 CHRONIC SPHENOIDAL SINUSITIS: ICD-10-CM

## 2022-02-01 DIAGNOSIS — Z98.1 ARTHRODESIS STATUS: Chronic | ICD-10-CM

## 2022-02-01 DIAGNOSIS — Z98.890 OTHER SPECIFIED POSTPROCEDURAL STATES: Chronic | ICD-10-CM

## 2022-02-01 DIAGNOSIS — Z98.89 OTHER SPECIFIED POSTPROCEDURAL STATES: Chronic | ICD-10-CM

## 2022-02-01 DIAGNOSIS — J34.3 HYPERTROPHY OF NASAL TURBINATES: ICD-10-CM

## 2022-02-01 PROCEDURE — 87075 CULTR BACTERIA EXCEPT BLOOD: CPT

## 2022-02-01 PROCEDURE — 87102 FUNGUS ISOLATION CULTURE: CPT

## 2022-02-01 PROCEDURE — 31259 NSL/SINS NDSC SPHN TISS RMVL: CPT | Mod: 50

## 2022-02-01 PROCEDURE — 61782 SCAN PROC CRANIAL EXTRA: CPT

## 2022-02-01 PROCEDURE — 88311 DECALCIFY TISSUE: CPT | Mod: 26

## 2022-02-01 PROCEDURE — 87070 CULTURE OTHR SPECIMN AEROBIC: CPT

## 2022-02-01 PROCEDURE — 88311 DECALCIFY TISSUE: CPT

## 2022-02-01 PROCEDURE — 87205 SMEAR GRAM STAIN: CPT

## 2022-02-01 PROCEDURE — 88305 TISSUE EXAM BY PATHOLOGIST: CPT | Mod: 26

## 2022-02-01 PROCEDURE — C1726: CPT

## 2022-02-01 PROCEDURE — 88312 SPECIAL STAINS GROUP 1: CPT | Mod: 26

## 2022-02-01 PROCEDURE — C1889: CPT

## 2022-02-01 PROCEDURE — 88312 SPECIAL STAINS GROUP 1: CPT

## 2022-02-01 PROCEDURE — 88305 TISSUE EXAM BY PATHOLOGIST: CPT

## 2022-02-01 DEVICE — SEEKER BLN EM SPHN 7X7MM
Type: IMPLANTABLE DEVICE | Status: NON-FUNCTIONAL
Removed: 2022-02-01

## 2022-02-01 DEVICE — SURGICEL 2 X 14"
Type: IMPLANTABLE DEVICE | Status: NON-FUNCTIONAL
Removed: 2022-02-01

## 2022-02-01 DEVICE — PACKING NASAL MEROGEL 4X4CM
Type: IMPLANTABLE DEVICE | Status: NON-FUNCTIONAL
Removed: 2022-02-01

## 2022-02-01 RX ORDER — ONDANSETRON 8 MG/1
4 TABLET, FILM COATED ORAL ONCE
Refills: 0 | Status: COMPLETED | OUTPATIENT
Start: 2022-02-01 | End: 2022-02-01

## 2022-02-01 RX ORDER — QUETIAPINE FUMARATE 200 MG/1
1 TABLET, FILM COATED ORAL
Qty: 0 | Refills: 0 | DISCHARGE

## 2022-02-01 RX ORDER — HYDROMORPHONE HYDROCHLORIDE 2 MG/ML
0.5 INJECTION INTRAMUSCULAR; INTRAVENOUS; SUBCUTANEOUS
Refills: 0 | Status: COMPLETED | OUTPATIENT
Start: 2022-02-01 | End: 2022-02-01

## 2022-02-01 RX ORDER — BUTALBITAL
1 POWDER (GRAM) MISCELLANEOUS
Qty: 0 | Refills: 0 | DISCHARGE

## 2022-02-01 RX ORDER — TIZANIDINE 4 MG/1
2 TABLET ORAL
Qty: 0 | Refills: 0 | DISCHARGE

## 2022-02-01 RX ORDER — AZTREONAM 2 G
1 VIAL (EA) INJECTION
Qty: 0 | Refills: 0 | DISCHARGE

## 2022-02-01 RX ORDER — PSEUDOEPHEDRINE HCL 30 MG
1 TABLET ORAL
Qty: 0 | Refills: 0 | DISCHARGE

## 2022-02-01 RX ORDER — LIDOCAINE HCL 20 MG/ML
0.2 VIAL (ML) INJECTION ONCE
Refills: 0 | Status: COMPLETED | OUTPATIENT
Start: 2022-02-01 | End: 2022-02-01

## 2022-02-01 RX ORDER — IBUPROFEN AND FAMOTIDINE 26.6; 8 MG/1; MG/1
1 TABLET, FILM COATED ORAL
Qty: 0 | Refills: 0 | DISCHARGE

## 2022-02-01 RX ORDER — FAMOTIDINE 10 MG/ML
0 INJECTION INTRAVENOUS
Qty: 0 | Refills: 0 | DISCHARGE

## 2022-02-01 RX ORDER — MOMETASONE FUROATE 50 UG/1
2 SPRAY NASAL
Qty: 0 | Refills: 0 | DISCHARGE

## 2022-02-01 RX ADMIN — HYDROMORPHONE HYDROCHLORIDE 0.5 MILLIGRAM(S): 2 INJECTION INTRAMUSCULAR; INTRAVENOUS; SUBCUTANEOUS at 10:53

## 2022-02-01 RX ADMIN — HYDROMORPHONE HYDROCHLORIDE 0.5 MILLIGRAM(S): 2 INJECTION INTRAMUSCULAR; INTRAVENOUS; SUBCUTANEOUS at 10:38

## 2022-02-01 RX ADMIN — SODIUM CHLORIDE 100 MILLILITER(S): 9 INJECTION, SOLUTION INTRAVENOUS at 08:03

## 2022-02-01 RX ADMIN — ONDANSETRON 4 MILLIGRAM(S): 8 TABLET, FILM COATED ORAL at 10:38

## 2022-02-01 NOTE — H&P ADULT - REASON FOR ADMISSION
4:09 AM :Pt care assumed from LUAN Mccarthy. Pt complaint(s), current treatment plan, progression and available diagnostic results have been discussed thoroughly. Rounding occurred: yes  Intended Disposition: TBD   Pending diagnostic reports and/or labs (please list): ua, uds    5:29 AM  Patients UA, UDS unremarkable. Patient resting comfortably. He has been alert, oreinted, appropriate and cooperative. No evidence for infectious or metabolic derangement. Will arrange transport for discharge.
I have reviewed discharge instructions with the patient. The patient verbalized understanding. Pt left by [facility], facility called and informed of pt's return.
Pt walked to Bathroom to attempt to urinate. Pt stated that he was unable to void.
chronic sinusitis Headaches

## 2022-02-01 NOTE — ASU PATIENT PROFILE, ADULT - FALL HARM RISK - UNIVERSAL INTERVENTIONS
Bed in lowest position, wheels locked, appropriate side rails in place/Call bell, personal items and telephone in reach/Instruct patient to call for assistance before getting out of bed or chair/Non-slip footwear when patient is out of bed/[place] to call system/Physically safe environment - no spills, clutter or unnecessary equipment/Purposeful Proactive Rounding/Room/bathroom lighting operational, light cord in reach

## 2022-02-01 NOTE — PRE-ANESTHESIA EVALUATION ADULT - NSPREOPDXFT_GEN_ALL_CORE
Initial: Pt % regular diet meeting needs. LBM 7/22 large per RN; 

constipation documented in EMR but resolved per RN. No nutrition concerns 

at this time. Will continue to monitor.

Rec:

1. continue regular diet

2. wt per rx

-------------------------------------------------------------------------------

Addendum: 07/23/19 at 1016 by Juan Lagunas RD

-------------------------------------------------------------------------------

Amended: Links added. chronic sinusitis

## 2022-02-01 NOTE — ASU PATIENT PROFILE, ADULT - FALL HARM RISK - CONCLUSION
SUBJECTIVE:   CC: Simran Barron is an 35 year old woman who presents for preventive health visit.     Healthy Habits:     Getting at least 3 servings of Calcium per day:  Yes    Bi-annual eye exam:  NO    Dental care twice a year:  NO    Sleep apnea or symptoms of sleep apnea:  None    Diet:  Regular (no restrictions)    Frequency of exercise:  1 day/week    Duration of exercise:  Less than 15 minutes    Taking medications regularly:  Yes    Medication side effects:  Not applicable    PHQ-2 Total Score: 0    Additional concerns today:  Yes (last month period was barely there)    1. Not having regular period  - ongoing issue  - pt is trying to get pregnant   - trying to get pregnant for 5 years   - not using any birth control  - history of abnormal thyroid studies     Today's PHQ-2 Score:   PHQ-2 ( 1999 [employer]) 5/9/2019   Q1: Little interest or pleasure in doing things 0   Q2: Feeling down, depressed or hopeless 0   PHQ-2 Score 0   Q1: Little interest or pleasure in doing things Not at all   Q2: Feeling down, depressed or hopeless Not at all   PHQ-2 Score 0       Abuse: Current or Past(Physical, Sexual or Emotional)- No  Do you feel safe in your environment? Yes    Social History     Tobacco Use     Smoking status: Never Smoker     Smokeless tobacco: Never Used   Substance Use Topics     Alcohol use: No     If you drink alcohol do you typically have >3 drinks per day or >7 drinks per week? No    Alcohol Use 5/9/2019   Prescreen: >3 drinks/day or >7 drinks/week? No   Prescreen: >3 drinks/day or >7 drinks/week? -   No flowsheet data found.    Reviewed orders with patient.  Reviewed health maintenance and updated orders accordingly - Yes    Mammogram not appropriate for this patient based on age, but based on family history placing referral.     Pertinent mammograms are reviewed under the imaging tab.  History of abnormal Pap smear: NO - age 30-65 PAP every 5 years with negative HPV co-testing  "recommended     Reviewed and updated as needed this visit by clinical staff  Tobacco  Allergies  Meds  Problems  Med Hx  Surg Hx  Fam Hx  Soc Hx          Reviewed and updated as needed this visit by Provider  Tobacco  Problems  Med Hx  Surg Hx  Fam Hx  Soc Hx         Review of Systems  CONSTITUTIONAL: NEGATIVE for fever, chills, change in weight  POSITIVE: feels hot flashes, feeling very tired, and gaining weight   INTEGUMENTARU/SKIN: NEGATIVE for worrisome rashes, moles or lesions  POSITIVE: skin dry and itchy   EYES: NEGATIVE for vision changes or irritation  POSITIVE: occasional blurry vision   ENT: NEGATIVE for ear, mouth and throat problems  RESP: NEGATIVE for significant cough or SOB  BREAST: NEGATIVE for masses, tenderness or discharge  CV: NEGATIVE for chest pain, palpitations or peripheral edema  GI: NEGATIVE for nausea, abdominal pain, heartburn, or change in bowel habits  : NEGATIVE for unusual urinary or vaginal symptoms. Periods are regular.  MUSCULOSKELETAL: NEGATIVE for significant arthralgias or myalgia  NEURO: NEGATIVE for weakness, dizziness or paresthesias  POSITIVE: sometimes hands feels numb in the morning   PSYCHIATRIC: NEGATIVE for changes in mood or affect     OBJECTIVE:   /78   Pulse 73   Temp 98.6  F (37  C) (Oral)   Resp 16   Ht 1.499 m (4' 11\")   Wt 87.6 kg (193 lb 3.2 oz)   LMP 04/18/2019 (Approximate)   SpO2 98%   BMI 39.02 kg/m    Physical Exam  GENERAL: healthy, alert and no distress  EYES: Eyes grossly normal to inspection, PERRL and conjunctivae and sclerae normal  HENT: ear canals and TM's normal, nose and mouth without ulcers or lesions  NECK: no adenopathy, no asymmetry, masses, or scars and thyroid normal to palpation  RESP: lungs clear to auscultation - no rales, rhonchi or wheezes  CV: regular rate and rhythm, normal S1 S2, no S3 or S4, no murmur, click or rub, no peripheral edema and peripheral pulses strong  ABDOMEN: soft, nontender, no " hepatosplenomegaly, no masses and bowel sounds normal   (female): normal female external genitalia, normal urethral meatus, vaginal mucosa pink, moist, well rugated, and normal cervix/adnexa/uterus without masses or discharge  MS: no gross musculoskeletal defects noted, no edema  SKIN: no suspicious lesions or rashes  NEURO: Normal strength and tone, mentation intact and speech normal  PSYCH: mentation appears normal, affect normal/bright      ASSESSMENT/PLAN:     1. Routine history and physical examination of adult  - reviewed [facility] and health maitenance     2. Family history of malignant neoplasm of breast  - sister with history of breast cancer  - CANCER RISK MGMT/CANCER GENETIC COUNSELING REFERRAL    3. Lipid screening  - Lipid panel reflex to direct LDL Fasting; Future    4. Screening for diabetes mellitus  - as below     5. Screening for malignant neoplasm of cervix  - Pap imaged thin layer screen with HPV - recommended age 30 - 65 years (select HPV order below)  - HPV High Risk Types DNA Cervical    6. Irregular periods  7. Female infertility  - referral for further evaluation and treatment   - OB/GYN REFERRAL    8. History of thyroid disease  - **TSH with free T4 reflex FUTURE anytime; Future    9. Fatigue, unspecified type  - Comprehensive metabolic panel; Future  - Vitamin D Deficiency; Future  - CBC with platelets and differential; Future    Multiple positive ROS symptoms reviewed would start with fatigue work up and patient should follow up for additional concerns.       Giovanna Lance PA-C  [facility]   Universal Safety Interventions

## 2022-02-01 NOTE — BRIEF OPERATIVE NOTE - NSICDXBRIEFOPLAUNCH_GEN_ALL_CORE
[facility] in [address] [facility] [address] [facility]    Phone:  [phone]    Fax:  [phone]           Austin Salvador   MRN: [id number]    Department:  [facility] in [facility]   Date of Vi IF THERE IS ANY CHANGE OR WORSENING OF YOUR CONDITION, CALL YOUR PRIMARY CARE PHYSICIAN AT ONCE OR RETURN IMMEDIATELY TO THE EMERGENCY DEPARTMENT.     If you have been prescribed any medication(s), please fill your prescription right away and begin taking t <--- Click to Launch ICDx for PreOp, PostOp and Procedure

## 2022-02-01 NOTE — ASU DISCHARGE PLAN (ADULT/PEDIATRIC) - ASU DC SPECIAL INSTRUCTIONSFT
keep head of bed elevated  Change drip pad as needed  Start afrin and saline sprays when u go home today  Office tmr

## 2022-02-01 NOTE — ASU PATIENT PROFILE, ADULT - PAIN DESCRIPTION (FREQUENCY/QUALITY), PROFILE
Patient requests all Lab, Cardiology, and Radiology Results on their Discharge Instructions
constant

## 2022-02-01 NOTE — H&P ADULT - HISTORY OF PRESENT ILLNESS
89 F long standing chronic sinusitis  S/p FESs 2018  Has grown fungus and bacteria on cultures.  Has not responded to oral antibiotics sinus rinse with budesonide and antifungal and antibacterial rinses. Recent ct SCAN SHOWS OPACIFIED BILATERAL SPHENOID SINUSES

## 2022-02-01 NOTE — H&P ADULT - NSHPPHYSICALEXAM_GEN_ALL_CORE
Head And neck neg  + sinusitis   Lungs Clear   Cor no gallops  Abd no masses    Ears clear  Eyes EOMI  EXT  s/p surgeries listed above  Skin no lesions  GYN/Rectal deferred

## 2022-02-01 NOTE — ASU DISCHARGE PLAN (ADULT/PEDIATRIC) - NS MD DC FALL RISK RISK
For information on Fall & Injury Prevention, visit: [email or web address] OR  [email or web address] OR  https://www.cdc.gov/steadi/patient.html

## 2022-02-02 LAB
GRAM STN FLD: SIGNIFICANT CHANGE UP
SPECIMEN SOURCE: SIGNIFICANT CHANGE UP

## 2022-02-06 LAB
CULTURE RESULTS: SIGNIFICANT CHANGE UP
SPECIMEN SOURCE: SIGNIFICANT CHANGE UP

## 2022-02-08 LAB — SURGICAL PATHOLOGY STUDY: SIGNIFICANT CHANGE UP

## 2022-03-02 LAB
CULTURE RESULTS: SIGNIFICANT CHANGE UP
SPECIMEN SOURCE: SIGNIFICANT CHANGE UP

## 2022-03-08 NOTE — ASU PATIENT PROFILE, ADULT - NSALCOHOLAMT_GEN_A_CORE_SD
Verbal given to [facility] on [address] for patient's insulin (humalog) records show the prescription was sent in but not confirmed by pharmacy
1-2 drinks

## 2022-08-15 NOTE — ASU PATIENT PROFILE, ADULT - PREOP PAIN SCORE
no problem  ----- Message -----  From: RANDY Keen  Sent: 8/17/2022   9:01 AM EDT  To: Danyell Davis OTR/L  Subject: RE:                                                thanks  ----- Message -----  From: Pam Licona OTR/L  Sent: 8/16/2022   6:39 AM EDT  To: RANDY Boyer      OT evaluation completed 8.15.22. pt caregivers declining OT due to being at functional baseline. pt performing I/ADL tasks at prior level of D, transfers to perform functional tasks at prior level of D, pt nonambulatory, and caregivers demonstrate good safety/technique with pt while performing ADL tasks and bed transfer. pt caregiver providing same level of A for pt as prior to referral. pt caregiver demonstrated I UB HEP. pt resisted OT attempt at BUE ROM testing. pt followed simple 1 step commands with 10% accuracy with max v/c, demonstration, and tactile cues to follow commands. pt followed daughters commands more than OT with 50% accuracy. pt does not require OT at this time as is not able to and resists participation due to cognitive deficits, is at baseline level of care, and caregivers demonstrated good safety and technique with level of care that pt requires and which is provided. pt spouse and daughter agree D/C OT at this time as pt does not require it and is not appropriate. D/C pt to care of Dr Yani Bryant.
thanks  ----- Message -----  From: TATA Hills/LAURIE  Sent: 8/16/2022   6:39 AM EDT  To: RANDY Dodd      OT evaluation completed 8.15.22. pt caregivers declining OT due to being at functional baseline. pt performing I/ADL tasks at prior level of D, transfers to perform functional tasks at prior level of D, pt nonambulatory, and caregivers demonstrate good safety/technique with pt while performing ADL tasks and bed transfer. pt caregiver providing same level of A for pt as prior to referral. pt caregiver demonstrated I UB HEP. pt resisted OT attempt at BUE ROM testing. pt followed simple 1 step commands with 10% accuracy with max v/c, demonstration, and tactile cues to follow commands. pt followed daughters commands more than OT with 50% accuracy. pt does not require OT at this time as is not able to and resists participation due to cognitive deficits, is at baseline level of care, and caregivers demonstrated good safety and technique with level of care that pt requires and which is provided. pt spouse and daughter agree D/C OT at this time as pt does not require it and is not appropriate. D/C pt to care of Dr [place].
0

## 2023-04-18 NOTE — ASU PREOP CHECKLIST - INTERNAL PROSTHESES
yes(specify)/plates and screws in neck and lumbar sacral region Ear Star Wedge Flap Text: The defect edges were debeveled with a #15 blade scalpel.  Given the location of the defect and the proximity to free margins (helical rim) an ear star wedge flap was deemed most appropriate.  Using a sterile surgical marker, the appropriate flap was drawn incorporating the defect and placing the expected incisions between the helical rim and antihelix where possible.  The area thus outlined was incised through and through with a #15 scalpel blade.

## 2023-10-27 ENCOUNTER — APPOINTMENT (OUTPATIENT)
Dept: PAIN MANAGEMENT | Facility: CLINIC | Age: 53
End: 2023-10-27
Payer: OTHER MISCELLANEOUS

## 2023-10-27 VITALS — HEIGHT: 64.5 IN | BODY MASS INDEX: 31.87 KG/M2 | WEIGHT: 189 LBS

## 2023-10-27 PROCEDURE — 99204 OFFICE O/P NEW MOD 45 MIN: CPT | Mod: 25

## 2023-10-27 PROCEDURE — 20552 NJX 1/MLT TRIGGER POINT 1/2: CPT

## 2023-12-13 ENCOUNTER — APPOINTMENT (OUTPATIENT)
Dept: PAIN MANAGEMENT | Facility: CLINIC | Age: 53
End: 2023-12-13

## 2023-12-15 ENCOUNTER — APPOINTMENT (OUTPATIENT)
Dept: PAIN MANAGEMENT | Facility: CLINIC | Age: 53
End: 2023-12-15

## 2024-01-05 ENCOUNTER — NON-APPOINTMENT (OUTPATIENT)
Age: 54
End: 2024-01-05

## 2024-01-10 ENCOUNTER — APPOINTMENT (OUTPATIENT)
Dept: PAIN MANAGEMENT | Facility: CLINIC | Age: 54
End: 2024-01-10

## 2024-03-27 ENCOUNTER — APPOINTMENT (OUTPATIENT)
Dept: PAIN MANAGEMENT | Facility: CLINIC | Age: 54
End: 2024-03-27
Payer: OTHER MISCELLANEOUS

## 2024-03-27 DIAGNOSIS — M50.20 OTHER CERVICAL DISC DISPLACEMENT, UNSPECIFIED CERVICAL REGION: ICD-10-CM

## 2024-03-27 DIAGNOSIS — M96.1 POSTLAMINECTOMY SYNDROME, NOT ELSEWHERE CLASSIFIED: ICD-10-CM

## 2024-03-27 PROCEDURE — 62321 NJX INTERLAMINAR CRV/THRC: CPT

## 2024-03-27 NOTE — PROCEDURE
[FreeTextEntry1] : T1-T2 thoracic interlaminar epidural steroid injection [FreeTextEntry2] : thoracic radiculopathy [FreeTextEntry3] : Procedure Date: 03/27/2024   Preoperative Diagnosis: thoracic radiculopathy   Procedure: T1-T2 epidural steroid injection under fluoroscopic guidance   Anesthesia: MAC   Complications: none   EBL: none   Procedure in detail: Patient was seen and examined. Risks, benefits, and alternatives for the procedure were discussed with the patient in detail. The patient expressed understanding, gave written and verbal consent, and placed themselves in a prone position on the procedure table. Skin overlying the posterior cervical spine was prepped with chloraprep and draped in the usual sterile fashion. Fluoroscopic images were obtained to identify the T1 and T2 vertebral body. Target was the interlaminar space between the T1 and T2 vertebral body. Skin overlying the target was marked and infiltrated with 1% lidocaine. Using an 20 gauge, 4.5 inch tuohy needle, this was inserted and advanced under intermittent fluoroscopic guidance. When felt to be engaged in the ligamentum flavum, contralateral oblique view was used to confirm depth. Needle then advanced using loss of resistance to saline technique until loss was achieved. After negative aspiration for heme/csf, contrast was injected under live fluoroscopy, which showed contrast spread consistent with epidural flow. No evidence of intravascular or intrathecal uptake. At this point, a total of 2ml of injectate, which consisted of 1ml of 80mg/ml depomedrol and 1ml of normal saline was injected. The needle was restyletted and withdrawn, a band aid was placed over the injection site. Patient tolerated the procedure well. The patient recovered uneventfully and was discharged home in stable condition.

## 2024-12-31 ENCOUNTER — APPOINTMENT (OUTPATIENT)
Dept: PAIN MANAGEMENT | Facility: CLINIC | Age: 54
End: 2024-12-31
Payer: OTHER MISCELLANEOUS

## 2024-12-31 VITALS — HEIGHT: 64.5 IN | WEIGHT: 191 LBS | BODY MASS INDEX: 32.21 KG/M2

## 2024-12-31 DIAGNOSIS — Z98.890 OTHER SPECIFIED POSTPROCEDURAL STATES: ICD-10-CM

## 2024-12-31 DIAGNOSIS — M50.20 OTHER CERVICAL DISC DISPLACEMENT, UNSPECIFIED CERVICAL REGION: ICD-10-CM

## 2024-12-31 DIAGNOSIS — M96.1 POSTLAMINECTOMY SYNDROME, NOT ELSEWHERE CLASSIFIED: ICD-10-CM

## 2024-12-31 DIAGNOSIS — M51.16 INTERVERTEBRAL DISC DISORDERS WITH RADICULOPATHY, LUMBAR REGION: ICD-10-CM

## 2024-12-31 PROCEDURE — 99214 OFFICE O/P EST MOD 30 MIN: CPT

## 2025-07-15 ENCOUNTER — APPOINTMENT (OUTPATIENT)
Dept: PAIN MANAGEMENT | Facility: CLINIC | Age: 55
End: 2025-07-15
Payer: OTHER MISCELLANEOUS

## 2025-07-15 VITALS — HEIGHT: 64.5 IN | WEIGHT: 169 LBS | BODY MASS INDEX: 28.5 KG/M2

## 2025-07-15 PROCEDURE — 99214 OFFICE O/P EST MOD 30 MIN: CPT | Mod: 25

## 2025-07-15 PROCEDURE — 20552 NJX 1/MLT TRIGGER POINT 1/2: CPT

## (undated) DEVICE — Device

## (undated) DEVICE — NDL HYPO REGULAR BEVEL 25G X 1.5" (BLUE)

## (undated) DEVICE — MEDICATION LABELS W MARKER

## (undated) DEVICE — DRAPE MAYO STAND 30"

## (undated) DEVICE — SUT CHROMIC 4-0 18" G-2

## (undated) DEVICE — VENODYNE/SCD SLEEVE CALF MEDIUM

## (undated) DEVICE — DRAPE MAGNETIC INSTRUMENT MEDIUM

## (undated) DEVICE — PACK THYROID HEAD NECK

## (undated) DEVICE — BASIN AMBULATORY

## (undated) DEVICE — ELCTR BOVIE TIP NEEDLE INSULATED 2.8" EDGE

## (undated) DEVICE — SYR LUER LOK 3CC

## (undated) DEVICE — DRSG SPLINT INTRA NASAL .25MM STANDARD THIN

## (undated) DEVICE — APPLICATOR Q TIP 6" WOOD STEM

## (undated) DEVICE — SOL IRR POUR H2O 250ML

## (undated) DEVICE — TUBING DIEGO SUCTION IRRIGATION 12FT

## (undated) DEVICE — MEDTRONIC PATIENT TRACKER ENT

## (undated) DEVICE — VAGINAL PACKING 2 X 6"

## (undated) DEVICE — SUT CHROMIC GUT 4-0 18" P-13

## (undated) DEVICE — TUBING IRRIGATION STRAIGHT SHOT

## (undated) DEVICE — APPLICATOR ESWAB 1ML LIQUID AMIES REG

## (undated) DEVICE — SOL ANTI FOG

## (undated) DEVICE — MARKING PEN W RULER

## (undated) DEVICE — MEDTRONIC AXIEM PATIENT TRACKER NON-INVASIVE

## (undated) DEVICE — BLADE MEDTRONIC ENT TRICUT NON-ROTATABLE STRAIGHT 4MM X 13CM

## (undated) DEVICE — WARMING BLANKET LOWER ADULT

## (undated) DEVICE — DRAPE MAYO STAND 23"

## (undated) DEVICE — DRAPE INSTRUMENT POUCH 6.75" X 11"

## (undated) DEVICE — ACCLARENT SET INFLATION DEVICE

## (undated) DEVICE — PACK NASAL

## (undated) DEVICE — TUBING SUCTION 20FT

## (undated) DEVICE — POSITIONER FOAM EGG CRATE ULNAR 2PCS (PINK)

## (undated) DEVICE — SYR LUER LOK 10CC

## (undated) DEVICE — BLADE SCALPEL SAFETYLOCK #15

## (undated) DEVICE — BLADE MEDTRONIC ENT FUSION TRICUT ROTATABLE STRAIGHT 4MM X 13CM

## (undated) DEVICE — STRYKER MALLEABLE SUCTION MEDIUM STANDARD

## (undated) DEVICE — SOL IRR POUR NS 0.9% 500ML

## (undated) DEVICE — SUT MONOSOF 3-0 18" C-14

## (undated) DEVICE — SPECIMEN CONTAINER 100ML

## (undated) DEVICE — ELCTR BOVIE SUCTION 11FR 8"

## (undated) DEVICE — ELCTR BOVIE TIP NEEDLE 2.84"

## (undated) DEVICE — MEDTRONIC INSTRUMENT TRACKER ENT

## (undated) DEVICE — SUT PLAIN GUT 4-0 18" CT-1

## (undated) DEVICE — S&N ARTHROCARE ENT WAND REFLEX ULTRA 45

## (undated) DEVICE — MEDTRONIC INFLATOR KIT FOR NUVENT EM SINUS DILATION SYSTEM

## (undated) DEVICE — CATH IV SAFE INSYTE 14G X 1.75" (ORANGE)